# Patient Record
Sex: FEMALE | Race: WHITE | HISPANIC OR LATINO | URBAN - METROPOLITAN AREA
[De-identification: names, ages, dates, MRNs, and addresses within clinical notes are randomized per-mention and may not be internally consistent; named-entity substitution may affect disease eponyms.]

---
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
## 2021-01-27 NOTE — ED PROVIDER NOTE - CHPI ED SYMPTOMS POS
Maureen Rivera was in the clinic today to see Mir Lemons MD for   Chief Complaint   Patient presents with   • Sleep Problem     ZULMA   • Follow-up   .    Please note, her blood pressures were elevated x2 while in the clinic.    BP Readings from Last 1 Encounters:   01/27/21 0920 (!) 140/78   Repeat /82 949 am 1-27-21    Please review with PCP and follow up with patient as appropriate.     RASH/COUGH/PAIN/ITCHING

## 2024-05-28 NOTE — ED PROVIDER NOTE - CROS ED SKIN ALL NEG
Pt will call pharmacy.  The refill exist there.  Will refuse per clinic protocol.  Rafaela Cervantes MS, RD, RN     - - -